# Patient Record
Sex: FEMALE | Race: WHITE | URBAN - METROPOLITAN AREA
[De-identification: names, ages, dates, MRNs, and addresses within clinical notes are randomized per-mention and may not be internally consistent; named-entity substitution may affect disease eponyms.]

---

## 2020-09-03 ENCOUNTER — TELEPHONE (OUTPATIENT)
Dept: HEMATOLOGY/ONCOLOGY | Facility: HOSPITAL | Age: 30
End: 2020-09-03

## 2020-09-03 NOTE — TELEPHONE ENCOUNTER
Patient haven't seen Dr. María Elena Aguila for some years 2013 and this patient have a law suit going and have some questions regarding her Diagnosis.

## 2020-09-03 NOTE — TELEPHONE ENCOUNTER
I called and she stated that she have a form from medical records and she guess that she will fill it out and she now live in [place] and only wanted her Dx., thanks gr

## 2022-02-18 ENCOUNTER — TELEPHONE (OUTPATIENT)
Dept: HEMATOLOGY/ONCOLOGY | Facility: HOSPITAL | Age: 32
End: 2022-02-18

## 2022-02-18 NOTE — TELEPHONE ENCOUNTER
Patient states that she need a consent to have a baby and she need Dr. Evans Peguero to approve it. Please call patient.

## 2022-02-18 NOTE — TELEPHONE ENCOUNTER
Patient is unable to get pregnant naturally and is looking into invitro fertilization. The fertility doctor is looking to use the eggs patient had harvested prior to starting chemotherapy many years ago. Her physician, Dr Cesar Tyson wants to get a letter clearing patient to do this. Message forwarded to Dr Charles Doss to advise.        Dr Cesar Tyson  Office # [phone] ext [phone]  Fax# [phone]

## 2022-02-22 ENCOUNTER — SOCIAL WORK SERVICES (OUTPATIENT)
Dept: HEMATOLOGY/ONCOLOGY | Facility: HOSPITAL | Age: 32
End: 2022-02-22

## 2022-02-22 NOTE — TELEPHONE ENCOUNTER
Per Dr Tamar Vincent, ok to start IVF process using eggs retrieved prior to starting chemotherapy.  to fax documentation of this to patient's physician, Dr Radha Arreola.

## 2022-02-22 NOTE — PROGRESS NOTES
completed letter and faxed to Dr. Brendan Vanegas at [address] [place] [address],9D ( 1990) is a patient under my care at [facility]. She is cleared to undergo IVF treatment and utilize the eggs that were preserved prior to the start of chemotherapy treatment.